# Patient Record
Sex: FEMALE | Race: WHITE | NOT HISPANIC OR LATINO | Employment: FULL TIME | URBAN - NONMETROPOLITAN AREA
[De-identification: names, ages, dates, MRNs, and addresses within clinical notes are randomized per-mention and may not be internally consistent; named-entity substitution may affect disease eponyms.]

---
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
## 2020-03-11 ENCOUNTER — HEALTH MAINTENANCE LETTER (OUTPATIENT)
Age: 20
End: 2020-03-11

## 2020-12-06 ENCOUNTER — NURSE TRIAGE (OUTPATIENT)
Dept: NURSING | Facility: CLINIC | Age: 20
End: 2020-12-06

## 2020-12-06 ENCOUNTER — HOSPITAL ENCOUNTER (EMERGENCY)
Facility: CLINIC | Age: 20
Discharge: HOME | End: 2020-12-06
Attending: EMERGENCY MEDICINE | Admitting: EMERGENCY MEDICINE
Payer: COMMERCIAL

## 2020-12-06 VITALS
TEMPERATURE: 97.9 F | HEART RATE: 98 BPM | RESPIRATION RATE: 16 BRPM | BODY MASS INDEX: 25.29 KG/M2 | HEIGHT: 65 IN | SYSTOLIC BLOOD PRESSURE: 101 MMHG | OXYGEN SATURATION: 100 % | DIASTOLIC BLOOD PRESSURE: 87 MMHG

## 2020-12-06 DIAGNOSIS — T26.91XA CHEMICAL INJURY OF RIGHT EYE, INITIAL ENCOUNTER: ICD-10-CM

## 2020-12-06 PROCEDURE — 99284 EMERGENCY DEPT VISIT MOD MDM: CPT

## 2020-12-06 PROCEDURE — 250N000009 HC RX 250: Performed by: EMERGENCY MEDICINE

## 2020-12-06 PROCEDURE — 99283 EMERGENCY DEPT VISIT LOW MDM: CPT | Performed by: EMERGENCY MEDICINE

## 2020-12-06 RX ORDER — TETRACAINE HYDROCHLORIDE 5 MG/ML
1-2 SOLUTION OPHTHALMIC ONCE
Status: COMPLETED | OUTPATIENT
Start: 2020-12-06 | End: 2020-12-06

## 2020-12-06 RX ADMIN — TETRACAINE HYDROCHLORIDE 1 DROP: 5 SOLUTION OPHTHALMIC at 19:32

## 2020-12-06 ASSESSMENT — ENCOUNTER SYMPTOMS: EYE PAIN: 0

## 2020-12-06 NOTE — ED AVS SNAPSHOT
[facility] ED Emergency Department  [address] [facility] [address] [address]  Phone: [phone]                                    Christina De Guzman   MRN: [id number]    Department: [facility] ED Emergency Department   Date of Visit: 12/6/2020           After Visit Summary Signature Page    I have received my discharge instructions, and my questions have been answered. I have discussed any challenges I see with this plan with the nurse or doctor.    ..........................................................................................................................................  Patient/Patient Representative Signature      ..........................................................................................................................................  Patient Representative Print Name and Relationship to Patient    ..................................................               ................................................  Date                                   Time    ..........................................................................................................................................  Reviewed by Signature/Title    ...................................................              ..............................................  Date                                               Time          22EPIC Rev 08/18

## 2020-12-07 NOTE — ED PROVIDER NOTES
"    [facility] EMERGENCY DEPARTMENT ([facility])  December 6, 2020  History     Chief Complaint   Patient presents with     Eye Injury     The history is provided by the patient and medical records.     Christina De Guzman is an otherwise healthy 19 year old female who presents here to the Emergency Department for evaluation of an eye problem. Patient reports she was making salsa about 4 hours prior to arrival when she took a bite out of a jalapeno and subsequently touched her right eye. She reports she had pain in that eye after touching it but denies rubbing her eyes. She states she rinsed out her right eye with \"allergy eye drops\" and later with a milk compress. She notes her right pupil is dilated and that has not happened to her previously with use of her allergy relief eye drops. Patient states she does not feel a foreign body sensation in her eye. Patient reports the pain has now gone away. She states she took her contact out of her right eye so it is hard to tell if her vision is impacted, but she feels it is somewhat more blurry than normal. She denies blind spots.      PAST MEDICAL HISTORY: History reviewed. No pertinent past medical history.    PAST SURGICAL HISTORY:   Past Surgical History:   Procedure Laterality Date     TONSILLECTOMY      12/2010,no anesthesia problems       Past medical history, past surgical history, medications, and allergies were reviewed with the patient. Additional pertinent items: None    FAMILY HISTORY:   Family History   Problem Relation Age of Onset     Thyroid Disease Mother         Thyroid Disease     Other - See Comments Mother 24        ulcerative colitis     Thyroid Disease Maternal Grandmother         Thyroid Disease     Other - See Comments Maternal Grandmother 25        gastric ulcers     Diabetes Maternal Grandfather         Diabetes     Heart Disease Maternal Grandfather         Heart Disease     Other - See Comments Maternal Grandfather         Stroke     " "Other - See Comments Paternal Grandmother         Stroke     Heart Disease Paternal Grandfather         Heart Disease     Hyperlipidemia Paternal Grandfather         Hyperlipidemia       SOCIAL HISTORY:   Social History     Tobacco Use     Smoking status: Never Smoker     Smokeless tobacco: Never Used   Substance Use Topics     Alcohol use: No     Alcohol/week: 0.0 standard drinks     Social history was reviewed with the patient. Additional pertinent items: None      Discharge Medication List as of 12/6/2020  8:16 PM      CONTINUE these medications which have NOT CHANGED    Details   albuterol (PROAIR HFA/PROVENTIL HFA/VENTOLIN HFA) 108 (90 Base) MCG/ACT Inhaler Inhale 2 puffs into the lungs every 6 hours as needed for shortness of breath / dyspnea or wheezing, Disp-1 Inhaler, R-0, E-Prescribe      azithromycin (ZITHROMAX) 250 MG tablet Two tablets first day, then one tablet daily for four days., Disp-6 tablet, R-0, E-Prescribe              No Known Allergies     Review of Systems   Eyes: Positive for visual disturbance (R eye blurry). Negative for pain.   All other systems reviewed and are negative.      Physical Exam   BP: 134/78  Pulse: 98  Temp: 97.9  F (36.6  C)  Resp: 16  Height: 165.1 cm (5' 5\")  SpO2: 100 %      Physical Exam  Vitals signs and nursing note reviewed.   Constitutional:       General: She is not in acute distress.     Appearance: Normal appearance. She is well-developed. She is not ill-appearing or diaphoretic.   HENT:      Head: Normocephalic and atraumatic. No abrasion, contusion, right periorbital erythema, left periorbital erythema or laceration.      Nose: Nose normal.   Eyes:      General: Lids are normal. Gaze aligned appropriately. No scleral icterus.        Right eye: No foreign body or discharge.         Left eye: No foreign body or discharge.      Extraocular Movements: Extraocular movements intact.      Conjunctiva/sclera: Conjunctivae normal.      Right eye: Right conjunctiva is " not injected. No chemosis, exudate or hemorrhage.     Left eye: Left conjunctiva is not injected. No chemosis, exudate or hemorrhage.     Pupils: Pupils are unequal.      Right eye: Pupil is sluggish. Pupil is round and reactive. No fluorescein uptake. Stella exam negative.      Left eye: Pupil is round, reactive and not sluggish.      Comments: Right pupil 5 mm, sluggishly reactive to light; left pupil 2 mm, briskly reactive to light   Neck:      Musculoskeletal: Normal range of motion and neck supple. No neck rigidity.   Cardiovascular:      Rate and Rhythm: Normal rate.   Pulmonary:      Effort: Pulmonary effort is normal. No respiratory distress.      Breath sounds: No stridor.   Abdominal:      General: There is no distension.   Musculoskeletal: Normal range of motion.         General: No deformity.   Skin:     General: Skin is warm and dry.      Coloration: Skin is not jaundiced or pale.      Findings: No bruising, lesion or rash.   Neurological:      General: No focal deficit present.      Mental Status: She is alert and oriented to person, place, and time.   Psychiatric:         Mood and Affect: Mood normal.         Behavior: Behavior normal.         Thought Content: Thought content normal.         ED Course   7:16 PM  The patient was seen and examined by Siria Mcdonnell MD in Room ED17.       Procedures                           No results found for this or any previous visit (from the past 24 hour(s)).  Medications   tetracaine (PONTOCAINE) 0.5 % ophthalmic solution 1-2 drop (1 drop Both Eyes Given 12/6/20 1932)             Assessments & Plan (with Medical Decision Making)   Christina De Guzman is an otherwise healthy 19 year old female who presents here to the Emergency Department for evaluation of an eye problem.     Ddx: conjunctival inflammation, chemical conjunctivitis, corneal injury    Patient no longer having pain or FB sensation. No conjunctival injection or drainage. Doubt corneal injury/burn  "given above. Sounds like minimal exposure to chemical irritant. Bilateral pH tested between 7 and 8 (normal range). Reports minor blurriness but does not have corrective lens in. Has mild pupillary dilation on effected on which is likely cause for some blurriness. Unclear what type of \"allergy drops\" patient used prior to arrival, and if these may have caused pupillary dilation. Tetracaine drops applied to right eye. Irrigated with 1 L NS using rich's lens. Patient advised to avoid contact with eyes tonight, dispose of contaminated contact lens, and do not place new contact lens in right eye until asymptomatic. If vision feels impaired in AM, advised her to call Eye clinic for same day appointment. Phone number provided.     Patient noted sensation of something in her eye after rich's lens removed and irrigation completed. Not painful. Woods lamp examination with fluorescein did not show e/o corneal abrasion.     Visual acuity 20/200 on left and 20/15 on right without corrective lens in right eye. Discussed with ophtho resident on phone who agreed with management including plan to discharge with contact info to set up follow up with eye clinic tomorrow if vision changes don't resolve or pupil does not go back to normal. Patient advised to keep contact out of eye for a few days. Return precautions provided.       I have reviewed the nursing notes.    I have reviewed the findings, diagnosis, plan and need for follow up with the patient.    Discharge Medication List as of 12/6/2020  8:16 PM          Final diagnoses:   Chemical injury of right eye, initial encounter   Rossi BAL, andrew serving as a trained medical scribe to document services personally performed by Siria Mcdonnell MD, based on the provider's statements to me.     Siria BAL MD, was physically present and have reviewed and verified the accuracy of this note documented by Rossi Smalls.     --  Siria Mcdonnell MD  12/6/2020   [facility] EMERGENCY DEPARTMENT     Siria Mcdonnell MD  12/06/20 [id number]

## 2020-12-07 NOTE — DISCHARGE INSTRUCTIONS
Please make an appointment to follow up with Eye Clinic (phone: [phone]) in 1 day as needed.    Your eye will continue to be numb for a while after the numbing drops were applied, placing you at increased risk of scratching your cornea. Do not rub or scratch your eye. Do not where contact lenses until any inflammation or irritation has resolved.     If you notice ongoing symptoms such as pain, redness, drainage, or vision changes, in the morning, you should call the eye clinic to set up a clinic appointment tomorrow.

## 2020-12-07 NOTE — ED TRIAGE NOTES
"Pt c/o R eye pain after she got jalapeno juice in it around 1500 today. She tried rinsing it out with \"allergy eye drops\" and later with milk. Pt state that the pain has gone away however, her vision is slightly more blurry. Pupil is dilated.   "

## 2020-12-07 NOTE — TELEPHONE ENCOUNTER
Pt called stated earlier today bout 3 pm she got jalapeno hot juice on her right eye. Pt stated the right eye is dilated, and blurred vision even after removing contact lenses. No pain, only burning sensation when the hot sauce got into the eye initial. Pt hold into the eye paper towel soaked in milk when the hot sauce got into the eye first, and used  allergy eye drops afterwards.    Per protocol pt was advised to go to the emergency department to be seen, pt stated okay. Near by hospital address and phone number given.             Ez Vargas RN  [facility]       COVID 19 Nurse Triage Plan/Patient Instructions    Please be aware that novel coronavirus (COVID-19) may be circulating in the community. If you develop symptoms such as fever, cough, or SOB or if you have concerns about the presence of another infection including coronavirus (COVID-19), please contact your health care provider or visit [email or web address].     Disposition/Instructions    ED Visit recommended. Follow protocol based instructions.     Bring Your Own Device:  Please also bring your smart device(s) (smart phones, tablets, laptops) and their charging cables for your personal use and to communicate with your care team during your visit.    Thank you for taking steps to prevent the spread of this virus.  o Limit your contact with others.  o Wear a simple mask to cover your cough.  o Wash your hands well and often.    Resources    [facility]: About COVID-19: [email or web address]    CDC: What to Do If You're Sick: www.cdc.gov/coronavirus/2019-ncov/about/steps-when-sick.html    CDC: Ending Home Isolation: www.cdc.gov/coronavirus/2019-ncov/hcp/disposition-in-home-patients.html     CDC: Caring for Someone: www.cdc.gov/coronavirus/2019-ncov/if-you-are-sick/care-for-someone.html     [facility]: Interim Guidance for Hospital Discharge to Home: www.health.[facility].mn.us/diseases/coronavirus/hcp/hospdischarge.pdf    [place]  [facility] clinical trials (COVID-19 research studies): [email or web address].[email or web address]-trials     Below are the COVID-19 hotlines at the [facility] of Health ([facility]). Interpreters are available.   o For health questions: Call 000-477-1253 or 1-847.647.2673 (7 a.m. to 7 p.m.)  o For questions about schools and childcare: Call [phone] or [phone] (7 a.m. to 7 p.m.)                     Reason for Disposition    [1] Blurred vision AND [2] persists > 1 hour since irrigation (regardless of duration of flushing)    Additional Information    Negative: Doesn't sound like foreign body (FB) in the eye    Negative: Foreign body is a piece of chemical    Negative: Foreign body (FB) stuck on eyeball  (Exception: contact lens)    Negative: [1] Sharp FB (even if FB was removed) AND [2] any pain present now    Negative: [1] Eye has been washed out > 30 minutes ago AND [2] feels like FB is still present    Negative: [1] Eye pain AND [2] persists > 1 hour since irrigation (regardless of duration of flushing)    Negative: [1] Continued tearing or blinking AND [2] persists > 1 hour since irrigation  (regardless of duration of flushing)    Protocols used: EYE - FOREIGN BODY-A-AH

## 2020-12-08 ENCOUNTER — OFFICE VISIT (OUTPATIENT)
Dept: OPHTHALMOLOGY | Facility: CLINIC | Age: 20
End: 2020-12-08
Attending: OPHTHALMOLOGY
Payer: COMMERCIAL

## 2020-12-08 DIAGNOSIS — T26.61XD: Primary | ICD-10-CM

## 2020-12-08 PROCEDURE — G0463 HOSPITAL OUTPT CLINIC VISIT: HCPCS

## 2020-12-08 PROCEDURE — 99203 OFFICE O/P NEW LOW 30 MIN: CPT | Mod: GC | Performed by: STUDENT IN AN ORGANIZED HEALTH CARE EDUCATION/TRAINING PROGRAM

## 2020-12-08 ASSESSMENT — VISUAL ACUITY
OD_SC+: -1
OS_SC: 20/30
METHOD: SNELLEN - LINEAR
OD_SC: 20/80
OD_PH_SC: 20/20
OS_PH_SC: 20/20

## 2020-12-08 ASSESSMENT — EXTERNAL EXAM - RIGHT EYE: OD_EXAM: NORMAL

## 2020-12-08 ASSESSMENT — CUP TO DISC RATIO
OD_RATIO: 0.2
OS_RATIO: 0.2

## 2020-12-08 ASSESSMENT — CONF VISUAL FIELD
OS_NORMAL: 1
OD_NORMAL: 1
METHOD: COUNTING FINGERS

## 2020-12-08 ASSESSMENT — TONOMETRY
OS_IOP_MMHG: 18
OD_IOP_MMHG: 18
IOP_METHOD: ICARE

## 2020-12-08 ASSESSMENT — EXTERNAL EXAM - LEFT EYE: OS_EXAM: NORMAL

## 2020-12-08 NOTE — NURSING NOTE
Chief Complaints and History of Present Illnesses   Patient presents with     Eye Trauma Evaluation      Chief Complaint(s) and History of Present Illness(es)     Eye Trauma Evaluation     Laterality: right eye    Duration: 3 days    Associated signs and symptoms: Negative for eye pain, lid swelling, tearing, eye discharge and vision loss    Pain scale: 0/10              Comments     Sunday patient had jalepeno pepper that was spread to the right eye from patients hand/finger(s).  Had SCL in.  Took SCL out to flush eye.  Flushed right eye with allergy eye drops.  Patients wears daily disposable contact lenses regularly.  Pupil right eye was dilated after exposure to jalepeno oils and combonation allergy eye gtt OTC  Eye meds: generic combo allergy OTC gtt  LORENA Lopez 12/8/2020 1:47 PM

## 2020-12-08 NOTE — PROGRESS NOTES
Chief Complaint(s) and History of Present Illness(es)     Eye Trauma Evaluation     Laterality: right eye    Duration: 3 days    Associated signs and symptoms: Negative for eye pain, lid swelling,   tearing, eye discharge and vision loss    Pain scale: 0/10              Comments     Sunday patient had jalepeno pepper that was spread to the right eye from   patients hand/finger(s).  Had daily contact in.  Took contacts out to flush eye.  Flushed right eye with allergy eye drops.  Patients wears daily disposable contact lenses regularly.  Pupil right eye was dilated after exposure to jalepeno oils and   combonation allergy eye gtt OTC  Eye meds: generic combo allergy OTC gtt  Cheguero Sundpankaj, [place] 12/8/2020 1:47 PM      Patient is unsure if her vision is back to normal as she is not wearing her contacts. She denies redness, pain.       Review of systems for the eyes was negative other than the pertinent positives/negatives listed in the HPI.      Assessment & Plan      Christina De Guzman is a 19 year old female with the following diagnoses:   1. Chemical injury to cornea of right eye, subsequent encounter       No conjunctival or epithelial injury on exam today  Pupils equal and briskly reactive   Can restart wearing contact lenses    Patient disposition: follow up as needed    Dolores Brown MD  Ophthalmology Resident, PGY-3       Attending Physician Attestation:  Complete documentation of historical and exam elements from today's encounter can be found in the full encounter summary report (not reduplicated in this progress note).  I personally obtained the chief complaint(s) and history of present illness.  I confirmed and edited as necessary the review of systems, past medical/surgical history, family history, social history, and examination findings as documented by others; and I examined the patient myself.  I personally reviewed the relevant tests, images, and reports as documented above.  I formulated and edited  as necessary the assessment and plan and discussed the findings and management plan with the patient and family. . - Demarcus Araiza MD

## 2020-12-27 ENCOUNTER — HEALTH MAINTENANCE LETTER (OUTPATIENT)
Age: 20
End: 2020-12-27

## 2021-04-25 ENCOUNTER — HEALTH MAINTENANCE LETTER (OUTPATIENT)
Age: 21
End: 2021-04-25

## 2021-10-09 ENCOUNTER — HEALTH MAINTENANCE LETTER (OUTPATIENT)
Age: 21
End: 2021-10-09

## 2022-05-21 ENCOUNTER — HEALTH MAINTENANCE LETTER (OUTPATIENT)
Age: 22
End: 2022-05-21

## 2022-09-17 ENCOUNTER — HEALTH MAINTENANCE LETTER (OUTPATIENT)
Age: 22
End: 2022-09-17

## 2023-06-04 ENCOUNTER — HEALTH MAINTENANCE LETTER (OUTPATIENT)
Age: 23
End: 2023-06-04

## 2023-09-05 ENCOUNTER — VIRTUAL VISIT (OUTPATIENT)
Dept: FAMILY MEDICINE | Facility: CLINIC | Age: 23
End: 2023-09-05
Payer: COMMERCIAL

## 2023-09-05 DIAGNOSIS — Z30.44 ENCOUNTER FOR SURVEILLANCE OF VAGINAL RING HORMONAL CONTRACEPTIVE DEVICE: Primary | ICD-10-CM

## 2023-09-05 DIAGNOSIS — N92.0 SPOTTING BETWEEN MENSES: ICD-10-CM

## 2023-09-05 PROCEDURE — 99203 OFFICE O/P NEW LOW 30 MIN: CPT | Mod: VID | Performed by: PHYSICIAN ASSISTANT

## 2023-09-05 RX ORDER — ETONOGESTREL AND ETHINYL ESTRADIOL VAGINAL RING .015; .12 MG/D; MG/D
1 RING VAGINAL
Qty: 3 EACH | Refills: 3 | Status: SHIPPED | OUTPATIENT
Start: 2023-09-05 | End: 2024-09-10

## 2023-09-05 RX ORDER — ETONOGESTREL AND ETHINYL ESTRADIOL VAGINAL RING .015; .12 MG/D; MG/D
1 RING VAGINAL
COMMUNITY
Start: 2023-01-29 | End: 2023-09-05

## 2023-09-05 ASSESSMENT — ANXIETY QUESTIONNAIRES
7. FEELING AFRAID AS IF SOMETHING AWFUL MIGHT HAPPEN: NOT AT ALL
IF YOU CHECKED OFF ANY PROBLEMS ON THIS QUESTIONNAIRE, HOW DIFFICULT HAVE THESE PROBLEMS MADE IT FOR YOU TO DO YOUR WORK, TAKE CARE OF THINGS AT HOME, OR GET ALONG WITH OTHER PEOPLE: NOT DIFFICULT AT ALL
GAD7 TOTAL SCORE: 2
2. NOT BEING ABLE TO STOP OR CONTROL WORRYING: NOT AT ALL
5. BEING SO RESTLESS THAT IT IS HARD TO SIT STILL: NOT AT ALL
3. WORRYING TOO MUCH ABOUT DIFFERENT THINGS: SEVERAL DAYS
GAD7 TOTAL SCORE: 2
4. TROUBLE RELAXING: NOT AT ALL
6. BECOMING EASILY ANNOYED OR IRRITABLE: NOT AT ALL
1. FEELING NERVOUS, ANXIOUS, OR ON EDGE: SEVERAL DAYS

## 2023-09-05 ASSESSMENT — PATIENT HEALTH QUESTIONNAIRE - PHQ9
10. IF YOU CHECKED OFF ANY PROBLEMS, HOW DIFFICULT HAVE THESE PROBLEMS MADE IT FOR YOU TO DO YOUR WORK, TAKE CARE OF THINGS AT HOME, OR GET ALONG WITH OTHER PEOPLE: NOT DIFFICULT AT ALL
SUM OF ALL RESPONSES TO PHQ QUESTIONS 1-9: 0
SUM OF ALL RESPONSES TO PHQ QUESTIONS 1-9: 0

## 2023-09-05 NOTE — PROGRESS NOTES
Christina is a 22 year old who is being evaluated via a billable video visit.      How would you like to obtain your AVS? MyChart  If the video visit is dropped, the invitation should be resent by: Text to cell phone: [phone]  Will anyone else be joining your video visit? No          Assessment & Plan     Encounter for surveillance of vaginal ring hormonal contraceptive device  Spotting between menses  Refill medicine and options discussed with patient at length; will continue with nuvaring but allow a period every 3-4 months; options for further work up placed if no improvement with above. Return to clinic with any worsening or changes in symptoms and follow up with PCP for routine care.     Review of prior external note(s) from - previous routine and acute care notes  20 minutes spent by me on the date of the encounter doing chart review, history and exam, documentation and further activities per the note       There are no Patient Instructions on file for this visit.    Rita Mariee PA-C  [facility]   Christina is a 22 year old, presenting for the following health issues:  Contraception (/)        9/5/2023     3:39 PM   Additional Questions   Roomed by Junior BILLINGS       History of Present Illness       Reason for visit:  To talk about my birth control and spotting issues    She eats 2-3 servings of fruits and vegetables daily.She consumes 4 sweetened beverage(s) daily.She exercises with enough effort to increase her heart rate 20 to 29 minutes per day.  She exercises with enough effort to increase her heart rate 3 or less days per week.   She is taking medications regularly.     Patient currently using nuvaring for 9 months with no break for menses and will occasionally get spotting.  Patient has bleeding this past month with 2 weeks of spotting, previously had been just 5 days.  History of being on oral contraceptive pill in the past but had a hard time remembering to  take it.  History of being told by Lucy no need for pap smear until 26 yo.    Review of Systems   Constitutional, HEENT, cardiovascular, pulmonary, GI, , musculoskeletal, neuro, skin, endocrine and psych systems are negative, except as otherwise noted.      Objective    Vitals - Patient Reported  Pain Score: No Pain (0)      Vitals:  No vitals were obtained today due to virtual visit.    Physical Exam   GENERAL: Healthy, alert and no distress  EYES: Eyes grossly normal to inspection.  No discharge or erythema, or obvious scleral/conjunctival abnormalities.  RESP: No audible wheeze, cough, or visible cyanosis.  No visible retractions or increased work of breathing.    SKIN: Visible skin clear. No significant rash, abnormal pigmentation or lesions.  NEURO: Cranial nerves grossly intact.  Mentation and speech appropriate for age.  PSYCH: Mentation appears normal, affect normal/bright, judgement and insight intact, normal speech and appearance well-groomed.            Video-Visit Details    Type of service:  Video Visit     Originating Location (pt. Location): Home    Distant Location (provider location):  On-site  Platform used for Video Visit: [employer]

## 2024-01-13 ENCOUNTER — NURSE TRIAGE (OUTPATIENT)
Dept: NURSING | Facility: CLINIC | Age: 24
End: 2024-01-13
Payer: COMMERCIAL

## 2024-01-13 NOTE — TELEPHONE ENCOUNTER
"Nurse Triage SBAR    Is this a 2nd Level Triage? NO    Situation: Pt calling re: eye injury.    Background: Pt's says she poked her right eye about 20 min ago. She was rubbing her eye when her dogs pushed into her causing her to poke herself in the eye. Says her vision is getting blurry    Assessment: Denies any swelling. Says there are \"two red dots\" on her eye that look like broken blood vessels, but the eye isn't outwardly bleeding. Pt says the eye looks a little more closed than her left eye.    Protocol Recommended Disposition:   Go to ED Now.    Recommendation: Recommend ED evaluation for blurred vision. Pt says she lives in [facility] [place] [facility] and will Google which hospital is closest to her. Recommend [facility]. Pt verbalized understanding.     Lizett Li, RN, BSN  [facility]   Triage Nurse Advisor    Reason for Disposition   Vision is blurred or lost in either eye    Additional Information   Negative: [1] Major bleeding (e.g., actively dripping or spurting) AND [2] can't be stopped   Negative: Knocked out (unconscious) > 1 minute   Negative: Difficult to awaken or acting confused (e.g., disoriented, slurred speech)   Negative: Severe neck pain   Negative: Sounds like a life-threatening emergency to the triager   Negative: Wound looks infected   Negative: Foreign body in the eye   Negative: Head injury is main concern   Negative: Neck injury is main concern    Protocols used: Eye Injury-A-AH    "

## 2024-07-13 ENCOUNTER — HEALTH MAINTENANCE LETTER (OUTPATIENT)
Age: 24
End: 2024-07-13

## 2024-09-10 ENCOUNTER — MYC REFILL (OUTPATIENT)
Dept: FAMILY MEDICINE | Facility: CLINIC | Age: 24
End: 2024-09-10
Payer: COMMERCIAL

## 2024-09-10 DIAGNOSIS — Z30.44 ENCOUNTER FOR SURVEILLANCE OF VAGINAL RING HORMONAL CONTRACEPTIVE DEVICE: ICD-10-CM

## 2024-09-10 RX ORDER — ETONOGESTREL AND ETHINYL ESTRADIOL VAGINAL RING .015; .12 MG/D; MG/D
1 RING VAGINAL
Qty: 1 EACH | Refills: 1 | Status: SHIPPED | OUTPATIENT
Start: 2024-09-10 | End: 2024-09-17

## 2024-09-10 NOTE — TELEPHONE ENCOUNTER
Patient informed Rx was sent.   [facility]     Johanna KENT

## 2024-09-10 NOTE — TELEPHONE ENCOUNTER
"Pharmacy not selected, but ok to sign and patient probably needs routine PE apt for further refills    Thanks  Rita \"Lester\" BETTINA Mariee   "

## 2024-09-10 NOTE — TELEPHONE ENCOUNTER
Team,  Bridge refill sent   Please assist patient with scheduling physical  Thanks,  Risa TAVARES RN

## 2024-09-17 ENCOUNTER — OFFICE VISIT (OUTPATIENT)
Dept: FAMILY MEDICINE | Facility: CLINIC | Age: 24
End: 2024-09-17
Payer: COMMERCIAL

## 2024-09-17 VITALS
SYSTOLIC BLOOD PRESSURE: 114 MMHG | BODY MASS INDEX: 20.83 KG/M2 | HEART RATE: 78 BPM | TEMPERATURE: 98.4 F | WEIGHT: 125 LBS | DIASTOLIC BLOOD PRESSURE: 80 MMHG | HEIGHT: 65 IN | OXYGEN SATURATION: 72 % | RESPIRATION RATE: 16 BRPM

## 2024-09-17 DIAGNOSIS — Z00.00 ROUTINE GENERAL MEDICAL EXAMINATION AT A HEALTH CARE FACILITY: Primary | ICD-10-CM

## 2024-09-17 DIAGNOSIS — Z30.44 ENCOUNTER FOR SURVEILLANCE OF VAGINAL RING HORMONAL CONTRACEPTIVE DEVICE: ICD-10-CM

## 2024-09-17 DIAGNOSIS — Z11.3 SCREENING FOR STDS (SEXUALLY TRANSMITTED DISEASES): ICD-10-CM

## 2024-09-17 PROCEDURE — 87491 CHLMYD TRACH DNA AMP PROBE: CPT | Performed by: PHYSICIAN ASSISTANT

## 2024-09-17 PROCEDURE — 87591 N.GONORRHOEAE DNA AMP PROB: CPT | Performed by: PHYSICIAN ASSISTANT

## 2024-09-17 PROCEDURE — 99395 PREV VISIT EST AGE 18-39: CPT | Performed by: PHYSICIAN ASSISTANT

## 2024-09-17 RX ORDER — ETONOGESTREL AND ETHINYL ESTRADIOL VAGINAL RING .015; .12 MG/D; MG/D
1 RING VAGINAL
Qty: 3 EACH | Refills: 11 | Status: SHIPPED | OUTPATIENT
Start: 2024-09-17

## 2024-09-17 ASSESSMENT — PAIN SCALES - GENERAL: PAINLEVEL: NO PAIN (0)

## 2024-09-17 NOTE — PROGRESS NOTES
Preventive Care Visit  [facility]  Sep 17, 2024      Assessment & Plan   Problem List Items Addressed This Visit    None  Visit Diagnoses       Routine general medical examination at a health care facility    -  Primary    Encounter for surveillance of vaginal ring hormonal contraceptive device        Relevant Medications    etonogestrel-ethinyl estradiol (NUVARING) 0.12-0.015 MG/24HR vaginal ring    Screening for STDs (sexually transmitted diseases)        Relevant Orders    Chlamydia trachomatis/Neisseria gonorrhoeae by PCR- VAGINAL SELF-SWAB             Patient has been advised of split billing requirements and indicates understanding: Yes       Counseling  Appropriate preventive services were addressed with this patient via screening, questionnaire, or discussion as appropriate for fall prevention, nutrition, physical activity, Tobacco-use cessation, social engagement, weight loss and cognition.  Checklist reviewing preventive services available has been given to the patient.  Reviewed patient's diet, addressing concerns and/or questions.   She is at risk for lack of exercise and has been provided with information to increase physical activity for the benefit of her well-being.     See Patient Instructions      Subjective   Jhonatan is a 23 year old, presenting for the following:  Physical        9/17/2024    12:48 PM   Additional Questions   Roomed by jas morelos        Health Care Directive  Patient does not have a Health Care Directive or Living Will: Discussed advance care planning with patient; however, patient declined at this time.    HPI        9/16/2024   General Health   How would you rate your overall physical health? Good   Feel stress (tense, anxious, or unable to sleep) Not at all            9/16/2024   Nutrition   Three or more servings of calcium each day? (!) I DON'T KNOW   Diet: Regular (no restrictions)   How many servings of fruit and  vegetables per day? (!) I DON'T KNOW   How many sweetened beverages each day? (!) 2            9/16/2024   Exercise   Days per week of moderate/strenous exercise 2 days   Average minutes spent exercising at this level 20 min      (!) EXERCISE CONCERN      9/16/2024   Social Factors   Frequency of gathering with friends or relatives Twice a week   Worry food won't last until get money to buy more No   Food not last or not have enough money for food? No   Do you have housing? (Housing is defined as stable permanent housing and does not include staying ouside in a car, in a tent, in an abandoned building, in an overnight shelter, or couch-surfing.) No   Are you worried about losing your housing? No   Lack of transportation? No   Unable to get utilities (heat,electricity)? No   Want help with housing or utility concern? No      (!) HOUSING CONCERN PRESENT      9/16/2024   Dental   Dentist two times every year? Yes            9/16/2024   TB Screening   Were you born outside of the [place]? No            Today's PHQ-2 Score:       9/16/2024     1:47 PM   PHQ-2 ( 1999 [employer])   Q1: Little interest or pleasure in doing things 0   Q2: Feeling down, depressed or hopeless 0   PHQ-2 Score 0   Q1: Little interest or pleasure in doing things Not at all   Q2: Feeling down, depressed or hopeless Not at all   PHQ-2 Score 0           9/16/2024   Substance Use   Alcohol more than 3/day or more than 7/wk No   Do you use any other substances recreationally? (!) CANNABIS PRODUCTS        Social History     Tobacco Use    Smoking status: Never    Smokeless tobacco: Current   Vaping Use    Vaping status: Former    Quit date: 6/5/2023    Substances: Nicotine   Substance Use Topics    Alcohol use: Yes    Drug use: Yes     Types: Marijuana     Comment: Drug use: No             9/16/2024   One time HIV Screening   Previous HIV test? I don't know          9/16/2024   STI Screening   New sexual partner(s) since last STI/HIV test? No        History of  abnormal Pap smear: No - age 21-29 PAP every 3 years recommended - Patient would like to hold off on pap until 26 yo, per previous Lucy suggestion             9/16/2024   Contraception/Family Planning   Questions about contraception or family planning No           Reviewed and updated as needed this visit by Provider                    History reviewed. No pertinent past medical history.  Past Surgical History:   Procedure Laterality Date    TONSILLECTOMY      12/2010,no anesthesia problems     BP Readings from Last 3 Encounters:   09/17/24 114/80   12/06/20 101/87   05/11/18 118/70    Wt Readings from Last 3 Encounters:   09/17/24 56.7 kg (125 lb)   05/11/18 68.9 kg (152 lb) (87%, Z= 1.11)*   04/11/18 69.9 kg (154 lb) (88%, Z= 1.17)*     * Growth percentiles are based on [facility] (Girls, 2-20 Years) data.                  Patient Active Problem List   Diagnosis   (none) - all problems resolved or deleted     Past Surgical History:   Procedure Laterality Date    TONSILLECTOMY      12/2010,no anesthesia problems       Social History     Tobacco Use    Smoking status: Never    Smokeless tobacco: Current   Substance Use Topics    Alcohol use: Yes     Family History   Problem Relation Age of Onset    Thyroid Disease Mother         Thyroid Disease    Other - See Comments Mother 24        ulcerative colitis    Strabismus Mother     Thyroid Disease Maternal Grandmother         Thyroid Disease    Other - See Comments Maternal Grandmother 25        gastric ulcers    Diabetes Maternal Grandfather         Diabetes    Heart Disease Maternal Grandfather         Heart Disease    Other - See Comments Maternal Grandfather         Stroke    Other - See Comments Paternal Grandmother         Stroke    Heart Disease Paternal Grandfather         Heart Disease    Hyperlipidemia Paternal Grandfather         Hyperlipidemia         Current Outpatient Medications   Medication Sig Dispense Refill    etonogestrel-ethinyl estradiol (NUVARING)  "0.12-0.015 MG/24HR vaginal ring Place 1 each vaginally every 28 days. 3 each 11     Allergies   Allergen Reactions    Cashew Nut Oil Anaphylaxis     No lab results found.       Review of Systems  Constitutional, neuro, ENT, endocrine, pulmonary, cardiac, gastrointestinal, genitourinary, musculoskeletal, integument and psychiatric systems are negative, except as otherwise noted.     Objective    Exam  LMP 09/01/2024    Estimated body mass index is 25.29 kg/m  as calculated from the following:    Height as of 12/6/20: 1.651 m (5' 5\").    Weight as of 5/11/18: 68.9 kg (152 lb).    Physical Exam  GENERAL: alert and no distress  EYES: Eyes grossly normal to inspection, PERRL and conjunctivae and sclerae normal  HENT: ear canals and TM's normal, nose and mouth without ulcers or lesions  NECK: no adenopathy, no asymmetry, masses, or scars  RESP: lungs clear to auscultation - no rales, rhonchi or wheezes  CV: regular rate and rhythm, normal S1 S2, no S3 or S4, no murmur, click or rub, no peripheral edema  MS: no gross musculoskeletal defects noted, no edema  SKIN: no suspicious lesions or rashes  NEURO: Normal strength and tone, mentation intact and speech normal  PSYCH: mentation appears normal, affect normal/bright        Signed Electronically by: Lester Mariee PA-C    "

## 2024-09-17 NOTE — PATIENT INSTRUCTIONS
Patient Education   Preventive Care Advice   This is general advice given by our system to help you stay healthy. However, your care team may have specific advice just for you. Please talk to your care team about your preventive care needs.  Nutrition  Eat 5 or more servings of fruits and vegetables each day.  Try wheat bread, brown rice and whole grain pasta (instead of white bread, rice, and pasta).  Get enough calcium and vitamin D. Check the label on foods and aim for 100% of the RDA (recommended daily allowance).  Lifestyle  Exercise at least 150 minutes each week  (30 minutes a day, 5 days a week).  Do muscle strengthening activities 2 days a week. These help control your weight and prevent disease.  No smoking.  Wear sunscreen to prevent skin cancer.  Have a dental exam and cleaning every 6 months.  Yearly exams  See your health care team every year to talk about:  Any changes in your health.  Any medicines your care team has prescribed.  Preventive care, family planning, and ways to prevent chronic diseases.  Shots (vaccines)   HPV shots (up to age 26), if you've never had them before.  Hepatitis B shots (up to age 59), if you've never had them before.  COVID-19 shot: Get this shot when it's due.  Flu shot: Get a flu shot every year.  Tetanus shot: Get a tetanus shot every 10 years.  Pneumococcal, hepatitis A, and RSV shots: Ask your care team if you need these based on your risk.  Shingles shot (for age 50 and up)  General health tests  Diabetes screening:  Starting at age 35, Get screened for diabetes at least every 3 years.  If you are younger than age 35, ask your care team if you should be screened for diabetes.  Cholesterol test: At age 39, start having a cholesterol test every 5 years, or more often if advised.  Bone density scan (DEXA): At age 50, ask your care team if you should have this scan for osteoporosis (brittle bones).  Hepatitis C: Get tested at least once in your life.  STIs (sexually  transmitted infections)  Before age 24: Ask your care team if you should be screened for STIs.  After age 24: Get screened for STIs if you're at risk. You are at risk for STIs (including HIV) if:  You are sexually active with more than one person.  You don't use condoms every time.  You or a partner was diagnosed with a sexually transmitted infection.  If you are at risk for HIV, ask about PrEP medicine to prevent HIV.  Get tested for HIV at least once in your life, whether you are at risk for HIV or not.  Cancer screening tests  Cervical cancer screening: If you have a cervix, begin getting regular cervical cancer screening tests starting at age 21.  Breast cancer scan (mammogram): If you've ever had breasts, begin having regular mammograms starting at age 40. This is a scan to check for breast cancer.  Colon cancer screening: It is important to start screening for colon cancer at age 45.  Have a colonoscopy test every 10 years (or more often if you're at risk) Or, ask your provider about stool tests like a FIT test every year or Cologuard test every 3 years.  To learn more about your testing options, visit:   .  For help making a decision, visit:   https://bit.ly/hj47172.  Prostate cancer screening test: If you have a prostate, ask your care team if a prostate cancer screening test (PSA) at age 55 is right for you.  Lung cancer screening: If you are a current or former smoker ages 50 to 80, ask your care team if ongoing lung cancer screenings are right for you.  For informational purposes only. Not to replace the advice of your health care provider. Copyright   2023 [facility]. All rights reserved. Clinically reviewed by the [facility]. [employer] 048803 - REV 01/24.  Substance Use Disorder: Care Instructions  Overview     You can improve your life and health by stopping your use of alcohol or drugs. When you don't drink or use drugs, you may feel and sleep better. You may  get along better with your family, friends, and coworkers. There are medicines and programs that can help with substance use disorder.  How can you care for yourself at home?  Here are some ways to help you stay sober and prevent relapse.  If you have been given medicine to help keep you sober or reduce your cravings, be sure to take it exactly as prescribed.  Talk to your doctor about programs that can help you stop using drugs or drinking alcohol.  Do not keep alcohol or drugs in your home.  Plan ahead. Think about what you'll say if other people ask you to drink or use drugs. Try not to spend time with people who drink or use drugs.  Use the time and money spent on drinking or drugs to do something that's important to you.  Preventing a relapse  Have a plan to deal with relapse. Learn to recognize changes in your thinking that lead you to drink or use drugs. Get help before you start to drink or use drugs again.  Try to stay away from situations, friends, or places that may lead you to drink or use drugs.  If you feel the need to drink alcohol or use drugs again, seek help right away. Call a trusted friend or family member. Some people get support from organizations such as Narcotics Anonymous or [employer] or from treatment facilities.  If you relapse, get help as soon as you can. Some people make a plan with another person that outlines what they want that person to do for them if they relapse. The plan usually includes how to handle the relapse and who to notify in case of relapse.  Don't give up. Remember that a relapse doesn't mean that you have failed. Use the experience to learn the triggers that lead you to drink or use drugs. Then quit again. Recovery is a lifelong process. Many people have several relapses before they are able to quit for good.  Follow-up care is a key part of your treatment and safety. Be sure to make and go to all appointments, and call your doctor if you are having problems. It's  "also a good idea to know your test results and keep a list of the medicines you take.  When should you call for help?   Call 911  anytime you think you may need emergency care. For example, call if you or someone else:    Has overdosed or has withdrawal signs. Be sure to tell the emergency workers that you are or someone else is using or trying to quit using drugs. Overdose or withdrawal signs may include:  Losing consciousness.  Seizure.  Seeing or hearing things that aren't there (hallucinations).     Is thinking or talking about suicide or harming others.   Where to get help 24 hours a day, 7 days a week   If you or someone you know talks about suicide, self-harm, a mental health crisis, a substance use crisis, or any other kind of emotional distress, get help right away. You can:    Call the Suicide and Crisis Lifeline at 988.     Call 3-581-997-TALK (1-148.827.8776).     Text HOME to 581908 to access the Crisis Text Line.   Consider saving these numbers in your phone.  Go to [employer] for more information or to chat online.  Call your doctor now or seek immediate medical care if:    You are having withdrawal symptoms. These may include nausea or vomiting, sweating, shakiness, and anxiety.   Watch closely for changes in your health, and be sure to contact your doctor if:    You have a relapse.     You need more help or support to stop.   Where can you learn more?  Go to https://www.[employer].net/patiented  Enter H573 in the search box to learn more about \"Substance Use Disorder: Care Instructions.\"  Current as of: November 15, 2023               Content Version: 14.0    6267-1328 [employer].   Care instructions adapted under license by your healthcare professional. If you have questions about a medical condition or this instruction, always ask your healthcare professional. [employer] disclaims any warranty or liability for your use of this information.         "

## 2024-09-18 LAB
C TRACH DNA SPEC QL PROBE+SIG AMP: NEGATIVE
N GONORRHOEA DNA SPEC QL NAA+PROBE: NEGATIVE

## 2024-09-19 NOTE — RESULT ENCOUNTER NOTE
"Adiel Israel,  Your attached labs are negative for gonorrhea and chlamydia.  Please contact the office with any questions or concerns.    Rita Price \"Lester\" BETTINA Mariee    "

## (undated) RX ORDER — ALBUTEROL SULFATE 0.83 MG/ML
SOLUTION RESPIRATORY (INHALATION)
Status: DISPENSED
Start: 2018-05-11

## (undated) RX ORDER — DEXAMETHASONE SODIUM PHOSPHATE 4 MG/ML
INJECTION, SOLUTION INTRA-ARTICULAR; INTRALESIONAL; INTRAMUSCULAR; INTRAVENOUS; SOFT TISSUE
Status: DISPENSED
Start: 2018-05-11